# Patient Record
Sex: MALE
[De-identification: names, ages, dates, MRNs, and addresses within clinical notes are randomized per-mention and may not be internally consistent; named-entity substitution may affect disease eponyms.]

---

## 2017-03-22 ENCOUNTER — HOSPITAL ENCOUNTER (EMERGENCY)
Dept: HOSPITAL 14 - H.ER | Age: 82
Discharge: HOME | End: 2017-03-22
Payer: MEDICARE

## 2017-03-22 VITALS — OXYGEN SATURATION: 97 % | HEART RATE: 67 BPM

## 2017-03-22 VITALS — TEMPERATURE: 97.2 F | DIASTOLIC BLOOD PRESSURE: 80 MMHG | SYSTOLIC BLOOD PRESSURE: 147 MMHG | RESPIRATION RATE: 18 BRPM
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
## 2017-03-22 DIAGNOSIS — S29.9XXA: Primary | ICD-10-CM

## 2017-03-22 DIAGNOSIS — I10: ICD-10-CM
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
## 2017-03-22 NOTE — ED PDOC
HPI: Back


Time Seen by Provider: 17 14:53


Chief Complaint (Nursing): Back Pain


Chief Complaint (Provider): Left-Sided Back Pain


History Per: Patient


History/Exam Limitations: no limitations


Onset/Duration Of Symptoms: Days (x3)


Current Symptoms Are (Timing): Still Present


Quality Of Discomfort: Unable To Describe


Severity: Moderate


Previous Symptoms: None


Associated Symptoms: None


Additional Complaint(s): 


Rachid Stewart is an 81 year old male, with no pertinent pasta medical 

history, who presents to the ED on 17 for the evaluation of moderate, left

-sided mid-thoracic back pain that he has experienced x3 days s/p mechanical 

fall. Patient states that he had been cleaning a banister when he had lost his 

balance and fallen backwards down a set of 2 steps, striking the affected area 

against a radiator. Denies head trauma or loss of consciousness as well as 

shortness of breath. Has medicated with Tylenol and Advil without relief, last 

dose of the latter being as of 3 hours prior to arrival.





PMD: Fidel Martinez





Past Medical History


Reviewed: Historical Data, Nursing Documentation, Vital Signs


Vital Signs: 


 Last Vital Signs











Temp  97.2 F L  17 14:04


 


Pulse  95 H  17 14:04


 


Resp  18   17 14:04


 


BP  147/80   17 14:04


 


Pulse Ox  99   17 14:04














- Medical History


PMH: Asthma, HTN


   Denies: Chronic Kidney Disease





- Surgical History


Other surgeries: b/l TKR





- Family History


Family History: States: Unknown Family Hx





- Home Medications


Home Medications: 


 Ambulatory Orders











 Medication  Instructions  Recorded


 


Albuterol Sulfate [Proair Hfa] 2 puff IH Q4H PRN #0 inh 16


 


Calcium Carbonate/Vitamin D3 1 tab PO DAILY #0 tablet 16





[Calcium 600-Vit D3 200 Tablet]  


 


Esomeprazole Magnesium [Nexium] 40 mg PO DAILY #0 capsule. 16


 


Meclizine [Meclizine*] 25 mg PO TID #0 tab 16


 


Omega-3 Fatty Acids/Fish Oil [Fish 1 gm PO DAILY #0 capsule 16





Oil 1,000 mg Capsule]  


 


Ondansetron [Zofran Tab] 4 mg PO TID PRN #0 tab 16


 


Ropinirole HCl [Requip] 0.25 mg PO HS #0 tablet 16


 


Rosuvastatin Calcium [Crestor] 5 mg PO HS #0 tab 16


 


Theophylline [Pasquale-24 Cap] 300 mg PO Q12H #0 c12 16


 


oxyCODONE/Acetaminophen [Percocet 1 ea PO Q6 PRN #8 tab 17





5/325 mg Tab]  














- Allergies


Allergies/Adverse Reactions: 


 Allergies











Allergy/AdvReac Type Severity Reaction Status Date / Time


 


No Known Allergies Allergy   Verified 07/15/16 15:52














Review of Systems


Respiratory: Negative for: Shortness of Breath


Musculoskeletal: Positive for: Back Pain (left-sided, mid-thoracic back pain)





Physical Exam





- Reviewed


Nursing Documentation Reviewed: Yes


Vital Signs Reviewed: Yes





- Physical Exam


Appears: Positive for: Non-toxic, No Acute Distress


Back: Positive for: Other (left-lower posterior chest wall tenderness).  

Negative for: L CVA Tenderness, R CVA Tenderness, Vertebral Tenderness


Neurologic/Psych: Positive for: Alert, Oriented





- ECG


O2 Sat by Pulse Oximetry: 99 (RA)


Pulse Ox Interpretation: Normal





- Progress


ED Course And Treament: 


cxr: ? rib fx No pneumothorax





Seen by respiratory therapist for incentive spirometry.





Medical Decision Making


Medical Decision Makin:53


Initial Impression: rib pain/injury; will r/o fracture





Patient has declined offered analgesics.





Initial Plan:


* XR Left Ribs and PA Chest





--------------------------------------------------------------------------------

----------------- 


Scribe Attestation:


Documented by Sahnthi Katz, acting as a scribe for Shavon Moreno PA-C.





Provider Scribe Attestation:


All medical record entries made by the Scribe were at my direction and 

personally dictated by me. I have reviewed the chart and agree that the record 

accurately reflects my personal performance of the history, physical exam, 

medical decision making, and the department course for this patient. I have 

also personally directed, reviewed, and agree with the discharge instructions 

and disposition.





Disposition





- Clinical Impression


Clinical Impression: 


 Rib fracture





- Patient ED Disposition


Is Patient to be Admitted: No





- Disposition


Disposition: Routine/Home


Disposition Time: 15:25


Condition: FAIR


Additional Instructions: 


F/U WITH DR. MARTINEZ IN 2 DAYS.


Prescriptions: 


oxyCODONE/Acetaminophen [Percocet 5/325 mg Tab] 1 ea PO Q6 PRN #8 tab


 PRN Reason: Pain, Severe (8-10)


Instructions:  Rib Fracture (ED)

## 2017-03-22 NOTE — RAD
PROCEDURE:  Radiographs of the Chest and Left Ribs.



HISTORY:

rib injury



COMPARISON:

7/15/2016. 



TECHNIQUE:

Frontal radiograph of the chest and multiple oblique radiographs of 

the left ribs were obtained.



FINDINGS:



LEFT RIBS:

No fracture or focal lesion visualized.



LUNGS:

Clear.



PLEURA:

Minimal blunting of left costophrenic angle may reflect pleural 

effusion or chronic pleural thickening. No pneumothorax.



CARDIOVASCULAR:

Normal sized heart. No pulmonary vascular congestion.



OTHER FINDINGS:

None.



IMPRESSION:

No evidence of left rib fracture.  Minimal blunting of left 

costophrenic angle.  Possible chronic pleural thickening versus small 

pleural effusion.

## 2017-12-15 ENCOUNTER — HOSPITAL ENCOUNTER (EMERGENCY)
Dept: HOSPITAL 14 - H.ER | Age: 82
Discharge: HOME | End: 2017-12-15
Payer: MEDICARE

## 2017-12-15 VITALS
DIASTOLIC BLOOD PRESSURE: 77 MMHG | RESPIRATION RATE: 17 BRPM | SYSTOLIC BLOOD PRESSURE: 134 MMHG | TEMPERATURE: 97 F | HEART RATE: 63 BPM | OXYGEN SATURATION: 99 %

## 2017-12-15 VITALS — BODY MASS INDEX: 27.1 KG/M2

## 2017-12-15 DIAGNOSIS — I10: ICD-10-CM

## 2017-12-15 DIAGNOSIS — J45.909: ICD-10-CM

## 2017-12-15 DIAGNOSIS — M25.531: Primary | ICD-10-CM

## 2017-12-15 NOTE — ED PDOC
Upper Extremity Pain/Injury


Time Seen by Provider: 12/15/17 08:23


Chief Complaint (Nursing): Upper Extremity Problem/Injury


Chief Complaint (Provider): Right Wrist Pain


History Per: Patient


History/Exam Limitations: no limitations


Onset/Duration Of Symptoms: Days (x2)


Current Symptoms Are (Timing): Still Present


Additional Complaint(s): 





Rachid Stewart is an 82 year old male with a past medical history of Asthma 

and Diabetes presenting to the ED for an evaluation of right wrist pain 

occurring for 2 days prior to arrival. The patient states associated tingling 

to his 3rd, 4th, and 5th fingers on his right hand and limited ability to move 

his hand due to the pain. He reports taking Tylenol for the pain, without 

relief. He denies experiencing any injury, excessive typing, lifting anything 

heavy, numbness, weakness, leg pain, chest pain, shortness of breath, cough, 

headache, or dizziness. 





PMD: Fidel Martinez MD








Past Medical History


Reviewed: Historical Data, Nursing Documentation, Vital Signs


Vital Signs: 


 Last Vital Signs











Temp  97 F L  12/15/17 08:13


 


Pulse  63   12/15/17 08:13


 


Resp  17   12/15/17 08:13


 


BP  134/77   12/15/17 08:13


 


Pulse Ox  99   12/15/17 08:13














- Medical History


PMH: Asthma, HTN


   Denies: Chronic Kidney Disease





- Surgical History


Surgical History: No Surg Hx





- Family History


Family History: States: No Known Family Hx





- Home Medications


Home Medications: 


 Ambulatory Orders











 Medication  Instructions  Recorded


 


RX: Albuterol Sulfate [Proair Hfa] 2 puff IH Q4H PRN #0 inh 07/16/16


 


RX: Calcium Carbonate/Vitamin D3 1 tab PO DAILY #0 tablet 07/16/16





[Calcium 600-Vit D3 200 Tablet]  


 


RX: Esomeprazole Magnesium [Nexium] 40 mg PO DAILY #0 capsule. 07/16/16


 


RX: Meclizine [Meclizine*] 25 mg PO TID #0 tab 07/16/16


 


RX: Omega-3 Fatty Acids/Fish Oil 1 gm PO DAILY #0 capsule 07/16/16





[Fish Oil 1,000 mg Capsule]  


 


RX: Ondansetron [Zofran Tab] 4 mg PO TID PRN #0 tab 07/16/16


 


RX: Ropinirole HCl [Requip] 0.25 mg PO HS #0 tablet 07/16/16


 


RX: Rosuvastatin Calcium [Crestor] 5 mg PO HS #0 tab 07/16/16


 


RX: Theophylline [Pasquale-24 Tab] 300 mg PO Q12H #0 c12 07/16/16


 


oxyCODONE/Acetaminophen [Percocet 1 ea PO Q6 PRN #8 tab 03/22/17





5/325 mg Tab]  














- Allergies


Allergies/Adverse Reactions: 


 Allergies











Allergy/AdvReac Type Severity Reaction Status Date / Time


 


No Known Allergies Allergy   Verified 07/15/16 15:52














Review of Systems


ROS Statement: Except As Marked, All Systems Reviewed And Found Negative


Constitutional: Negative for: Other (no recent injury)


Cardiovascular: Negative for: Chest Pain


Respiratory: Negative for: Cough, Shortness of Breath


Musculoskeletal: Positive for: Hand Pain (right wrist pain; limited ROM 

secondary to pain).  Negative for: Leg Pain


Neurological: Positive for: Other (tingling to 3rd, 4th, and 5th fingers).  

Negative for: Weakness, Numbness, Headache, Dizziness





Physical Exam





- Reviewed


Nursing Documentation Reviewed: Yes


Vital Signs Reviewed: Yes





- Physical Exam


Appears: Positive for: Non-toxic, No Acute Distress


Head Exam: Positive for: ATRAUMATIC, NORMOCEPHALIC


Skin: Positive for: Normal Color, Warm, Dry


Eye Exam: Positive for: Normal appearance, EOMI


ENT: Positive for: Normal ENT Inspection


Neck: Positive for: Normal, Painless ROM, Supple


Cardiovascular/Chest: Positive for: Regular Rate, Rhythm, Chest Non Tender.  

Negative for: Murmur


Respiratory: Positive for: Normal Breath Sounds.  Negative for: Respiratory 

Distress


Pulses-Radial (L): 2+ (and 2+ ulnar aspect)


Pulses-Radial (R): 2+ (and 2+ ulnar aspect)


Gastrointestinal/Abdominal: Positive for: Normal Exam, Soft.  Negative for: 

Tenderness


Back: Positive for: Normal Inspection


Extremity: Positive for: Tenderness (to right medial wrist).  Negative for: 

Normal ROM (limited ROM to medial/lateral area of right wrist and fingers 

secondary to pain), Pedal Edema, Deformity, Swelling


Neurologic/Psych: Positive for: Alert, Oriented (x3).  Negative for: Motor/

Sensory Deficits





- ECG


O2 Sat by Pulse Oximetry: 99 (RA)


Pulse Ox Interpretation: Normal





- Radiology


X-Ray: Interpreted by Me, Viewed By Me


X-Ray Interpretation: No Acute Disease





- Progress


ED Course And Treament: 





933:  Stable.  AAOx3.  Pain controlled.  FU with pcp.





Medical Decision Making


Medical Decision Making: 





Time: 08:23


Impression: Right Wrist Pain


Plan:


* Motrin Tab 600 mg PO


* [RAD] Wrist, Right 3 Views


* Reevaluation





--------------------------------------------------------------------------------

----------------- 


Scribe Attestation:


Documented by Isabela Ballard, acting as a scribe for Rudy Morrison MD.


 


Provider Scribe Attestation:


All medical record entries made by the Scribe were at my direction and 

personally dictated by me. I have reviewed the chart and agree that the record 

accurately reflects my personal performance of the history, physical exam, 

medical decision making, and the department course for this patient. I have 

also personally directed, reviewed, and agree with the discharge instructions 

and disposition.








Disposition





- Clinical Impression


Clinical Impression: 


 Wrist pain








- Patient ED Disposition


Is Patient to be Admitted: No


Counseled Patient/Family Regarding: Studies Performed, Diagnosis, Need For 

Followup





- Disposition


Referrals: 


[facility] [Outside] - 12/18/17


Disposition: Routine/Home


Disposition Time: 09:35


Condition: STABLE


Additional Instructions: 


Return if not better in 3 days. 


Instructions:  Arthralgia (ED)


Forms:  [facility] (English)

## 2017-12-15 NOTE — RAD
PROCEDURE:  Right Wrist Radiographs.







HISTORY:

pain



COMPARISON:

None.



FINDINGS:



BONES:

No acute fracture.  Diffuse osteopenia.







JOINTS:

Osteoarthritic changes, incompletely visualize.  There is evidence of 

chondrocalcinosis common normal variant



SOFT TISSUES:

Normal. 



OTHER FINDINGS:

None.



IMPRESSION:

No acute findings related to/accounting  for the clinical 

presentation. Additional benign and/or incidental findings described 

above.



___________________________________________________________



Concordant results with the preliminary interpretation rendered by 

the emergency department physician

procedure.

## 2019-03-31 ENCOUNTER — HOSPITAL ENCOUNTER (EMERGENCY)
Dept: HOSPITAL 14 - H.ER | Age: 84
Discharge: HOME | End: 2019-03-31
Payer: MEDICARE

## 2019-03-31 VITALS — TEMPERATURE: 97.9 F | DIASTOLIC BLOOD PRESSURE: 64 MMHG | SYSTOLIC BLOOD PRESSURE: 123 MMHG

## 2019-03-31 VITALS — RESPIRATION RATE: 18 BRPM | OXYGEN SATURATION: 99 %

## 2019-03-31 VITALS — BODY MASS INDEX: 27.9 KG/M2

## 2019-03-31 VITALS — HEART RATE: 91 BPM

## 2019-03-31 DIAGNOSIS — I10: ICD-10-CM

## 2019-03-31 DIAGNOSIS — Z79.899: ICD-10-CM

## 2019-03-31 DIAGNOSIS — J45.901: Primary | ICD-10-CM

## 2019-03-31 LAB
BASOPHILS # BLD AUTO: 0 K/UL (ref 0–0.2)
BASOPHILS NFR BLD: 0.4 % (ref 0–2)
BNP SERPL-MCNC: 200 PG/ML (ref 0–900)
BUN SERPL-MCNC: 12 MG/DL (ref 9–20)
CALCIUM SERPL-MCNC: 9.5 MG/DL (ref 8.4–10.2)
EOSINOPHIL # BLD AUTO: 0.2 K/UL (ref 0–0.7)
EOSINOPHIL NFR BLD: 2 % (ref 0–4)
ERYTHROCYTE [DISTWIDTH] IN BLOOD BY AUTOMATED COUNT: 16.4 % (ref 11.5–14.5)
GFR NON-AFRICAN AMERICAN: > 60
HGB BLD-MCNC: 10 G/DL (ref 12–18)
LYMPHOCYTES # BLD AUTO: 1.6 K/UL (ref 1–4.3)
LYMPHOCYTES NFR BLD AUTO: 20.1 % (ref 20–40)
MCH RBC QN AUTO: 24.9 PG (ref 27–31)
MCHC RBC AUTO-ENTMCNC: 31.9 G/DL (ref 33–37)
MCV RBC AUTO: 78 FL (ref 80–94)
MONOCYTES # BLD: 0.9 K/UL (ref 0–0.8)
MONOCYTES NFR BLD: 10.7 % (ref 0–10)
NEUTROPHILS # BLD: 5.4 K/UL (ref 1.8–7)
NEUTROPHILS NFR BLD AUTO: 66.8 % (ref 50–75)
NRBC BLD AUTO-RTO: 0 % (ref 0–0)
PLATELET # BLD: 235 K/UL (ref 130–400)
PMV BLD AUTO: 8.4 FL (ref 7.2–11.7)
RBC # BLD AUTO: 4.02 MIL/UL (ref 4.4–5.9)
WBC # BLD AUTO: 8.1 K/UL (ref 4.8–10.8)

## 2019-03-31 PROCEDURE — 84484 ASSAY OF TROPONIN QUANT: CPT

## 2019-03-31 PROCEDURE — 99283 EMERGENCY DEPT VISIT LOW MDM: CPT

## 2019-03-31 PROCEDURE — 71046 X-RAY EXAM CHEST 2 VIEWS: CPT

## 2019-03-31 PROCEDURE — 83880 ASSAY OF NATRIURETIC PEPTIDE: CPT

## 2019-03-31 PROCEDURE — 80048 BASIC METABOLIC PNL TOTAL CA: CPT

## 2019-03-31 PROCEDURE — 87804 INFLUENZA ASSAY W/OPTIC: CPT

## 2019-03-31 PROCEDURE — 80198 ASSAY OF THEOPHYLLINE: CPT

## 2019-03-31 PROCEDURE — 85025 COMPLETE CBC W/AUTO DIFF WBC: CPT

## 2019-03-31 PROCEDURE — 93005 ELECTROCARDIOGRAM TRACING: CPT

## 2019-03-31 PROCEDURE — 96374 THER/PROPH/DIAG INJ IV PUSH: CPT

## 2019-03-31 NOTE — ED PDOC
HPI: Asthma


Time Seen by Provider: 03/31/19 10:45


Chief Complaint (Nursing): Cough, Cold, Congestion


Chief Complaint (Provider): shortness of breath, cough


History Per: Patient, Family


History/Exam Limitations: no limitations


Onset/Duration Of Symptoms: Days (5), Gradual


Associated Symptoms: Dyspnea, Cough, URI.  denies: Sputum Production, Fever


Precipitating Factors: URI Symptoms


Severity: Moderate


Additional Complaint(s): 





82yo male presents w family notes ongoing and worsening cough with dyspnea for 

about 5 days, had CXR thursday, does not know results yet, denies edema or 

syncope. Does not SOB worse with laying down. Denies fever but has mild 

weakness, sore throat and headache. Did receive flu shot this year. 





Past Medical History


Reviewed: Historical Data, Nursing Documentation, Vital Signs


Vital Signs: 





                                Last Vital Signs











Temp  98.0 F   03/31/19 10:40


 


Pulse  91 H  03/31/19 10:40


 


Resp  18   03/31/19 10:40


 


BP  150/79   03/31/19 10:40


 


Pulse Ox  99   03/31/19 10:40














- Medical History


PMH: Asthma, HTN


   Denies: Chronic Kidney Disease





- Surgical History


Other surgeries: knees





- Family History


Family History: States: Unknown Family Hx





- Social History


Current smoker - smoking cessation education provided: No





- Home Medications


Home Medications: 


                                Ambulatory Orders











 Medication  Instructions  Recorded


 


Albuterol Sulfate [Proair Hfa] 2 puff IH Q4H PRN #0 inh 07/16/16


 


Calcium Carbonate/Vitamin D3 1 tab PO DAILY #0 tablet 07/16/16





[Calcium 600-Vit D3 200 Tablet]  


 


Esomeprazole Magnesium [Nexium] 40 mg PO DAILY #0 capsule. 07/16/16


 


Meclizine [Meclizine*] 25 mg PO TID #0 tab 07/16/16


 


Omega-3 Fatty Acids/Fish Oil [Fish 1 gm PO DAILY #0 capsule 07/16/16





Oil 1,000 mg Capsule]  


 


Ondansetron [Zofran Tab] 4 mg PO TID PRN #0 tab 07/16/16


 


Ropinirole HCl [Requip] 0.25 mg PO HS #0 tablet 07/16/16


 


Rosuvastatin Calcium [Crestor] 5 mg PO HS #0 tab 07/16/16


 


Theophylline [Pasquale-24 Tab] 300 mg PO Q12H #0 c12 07/16/16


 


oxyCODONE/Acetaminophen [Percocet 1 ea PO Q6 PRN #8 tab 03/22/17





5/325 mg Tab]  


 


Albuterol 0.083% [Albuterol 0.083% 2.5 mg IH Q4 PRN #20 neb 03/31/19





Inhal Sol (2.5 mg/3 ml) UD]  


 


Prednisone 50 mg PO DAILY #4 tab 03/31/19














- Allergies


Allergies/Adverse Reactions: 


                                    Allergies











Allergy/AdvReac Type Severity Reaction Status Date / Time


 


No Known Allergies Allergy   Verified 07/15/16 15:52














Review of Systems


Constitutional: Positive for: Chills, Malaise.  Negative for: Fever


Cardiovascular: Positive for: Orthopnea.  Negative for: Edema, Light Headedness


Respiratory: Positive for: Cough, Shortness of Breath, SOB with Exertion, 

Wheezing


Gastrointestinal: Negative for: Abdominal Pain


Musculoskeletal: Negative for: Neck Pain, Back Pain


Skin: Negative for: Rash, Lesions


Neurological: Positive for: Headache.  Negative for: Weakness, Numbness, Altered

Mental Status


Psych: Negative for: Suicidal ideation





Physical Exam





- Reviewed


Nursing Documentation Reviewed: Yes


Vital Signs Reviewed: Yes





- Physical Exam


Appears: Positive for: Well, Non-toxic, No Acute Distress


Head Exam: Positive for: ATRAUMATIC, NORMAL INSPECTION, NORMOCEPHALIC


Skin: Positive for: Normal Color, Warm, DRY


Eye Exam: Positive for: EOMI, Normal appearance, PERRL


ENT: Positive for: Normal ENT Inspection


Neck: Positive for: Normal, Painless ROM


Cardiovascular/Chest: Positive for: Regular Rate, Rhythm


Respiratory: Positive for: Decreased Breath Sounds, Wheezing.  Negative for: 

Respiratory Distress


Gastrointestinal/Abdominal: Positive for: Soft


Back: Positive for: Normal Inspection


Extremity: Positive for: Normal ROM.  Negative for: Deformity, Swelling


Neurological/Psych: Positive for: Awake, Alert, Normal Tone





- Laboratory Results


Result Diagrams: 


                                 03/31/19 11:00





                                 03/31/19 11:00





- ECG


ECG: Positive for: Interpreted By Me


ECG Rhythm: Positive for: Sinus Rhythm, Nonspecific Changes


Rate: 91


O2 Sat by Pulse Oximetry: 99


Pulse Ox Interpretation: Normal





Medical Decision Making


Medical Decision Making: 





labs reviewed, normal WBC, unremarkable chem, normal BNP and trop





CXR repeated and no change since prior exam





duoneb x3 given and clinically much improved, wheeze resolved, patient ambulated

well, tolerated full PO and wished to go home.


D/w Dr Martinez PMD, will add prednisone pulse dose, he requested theophyline level

and followup early this week in office


No tachycardia, no hypoxia, normal mentation and resp effort on discharge











Disposition





- Clinical Impression


Clinical Impression: 


 Asthma exacerbation








- Patient ED Disposition


Is Patient to be Admitted: No





- Disposition


Referrals: 


Fidel Martinez MD [Family Provider] - 


Disposition: Routine/Home


Disposition Time: 14:10


Condition: STABLE


Additional Instructions: 


Followup with Dr Martinez tomorrow, return to ER for any worse or new symptoms. 

Take medications as directed.





Prescriptions: 


Albuterol 0.083% [Albuterol 0.083% Inhal Sol (2.5 mg/3 ml) UD] 2.5 mg IH Q4 PRN 

#20 neb


 PRN Reason: Wheezing


Prednisone 50 mg PO DAILY #4 tab


Instructions:  Asthma, Adult (DC), Medicines for Asthma

## 2019-03-31 NOTE — RAD
Date of service: 



03/31/2019



HISTORY:

 persistent and worsening cough 



COMPARISON:

Comparison chest 03/28/2019.



TECHNIQUE:

Chest PA and lateral views



FINDINGS:



LUNGS:

Minor bibasilar atelectasis left greater than right.  There is 

flattening of the hemidiaphragms and slight increase in in 

retrosternal airspace consistent with underlying COPD 



PLEURA:

No significant pleural effusion identified. No pneumothorax apparent.



CARDIOVASCULAR:

Mild aortic atherosclerotic calcification present.



Heart size is upper limits of normal/borderline enlarged no pulmonary 

vascular congestion. 



OSSEOUS STRUCTURES:

Minor multilevel degenerative spondylosis of the thoracic spine.



VISUALIZED UPPER ABDOMEN:

Normal.



OTHER FINDINGS:

None.



IMPRESSION:

Minor bibasilar atelectasis left greater than right.  There is 

flattening of the hemidiaphragms and slight increase in in 

retrosternal airspace consistent with underlying COPD

## 2019-04-01 NOTE — CARD
--------------- APPROVED REPORT --------------





Date of service: 03/31/2019



EKG Measurement

Heart [id number]

[id number]

[id number]

[id number]

[id number]



<Conclusion>

Normal sinus rhythm

Nonspecific ST and T wave abnormality

Abnormal ECG

## 2022-03-24 ENCOUNTER — NEW PATIENT (OUTPATIENT)
Dept: URBAN - METROPOLITAN AREA SURGICAL CENTER 72 | Facility: SURGICAL CENTER | Age: 87
End: 2022-03-24

## 2022-03-24 DIAGNOSIS — H04.123: ICD-10-CM

## 2022-03-24 DIAGNOSIS — H18.413: ICD-10-CM

## 2022-03-24 DIAGNOSIS — H52.4: ICD-10-CM

## 2022-03-24 DIAGNOSIS — H52.03: ICD-10-CM

## 2022-03-24 DIAGNOSIS — H40.1133: ICD-10-CM

## 2022-03-24 DIAGNOSIS — H43.393: ICD-10-CM

## 2022-03-24 DIAGNOSIS — H52.203: ICD-10-CM

## 2022-03-24 PROCEDURE — 99204 OFFICE O/P NEW MOD 45 MIN: CPT

## 2022-03-24 PROCEDURE — 92020 GONIOSCOPY: CPT

## 2022-03-24 PROCEDURE — 92133 CPTRZD OPH DX IMG PST SGM ON: CPT

## 2022-03-24 PROCEDURE — 92015 DETERMINE REFRACTIVE STATE: CPT

## 2022-03-24 ASSESSMENT — VISUAL ACUITY
OD_CC: 20/50
OS_CC: 20/200
OS_SC: 20/200
OD_SC: 20/50

## 2022-03-24 ASSESSMENT — KERATOMETRY
OS_K1POWER_DIOPTERS: 40.75
OD_AXISANGLE_DEGREES: 27
OD_AXISANGLE2_DEGREES: 117
OS_AXISANGLE_DEGREES: 151
OS_K2POWER_DIOPTERS: 43.25
OD_K1POWER_DIOPTERS: 40.75
OD_K2POWER_DIOPTERS: 41.75
OS_AXISANGLE2_DEGREES: 61

## 2022-03-24 ASSESSMENT — TONOMETRY
OS_IOP_MMHG: 16
OD_IOP_MMHG: 16

## 2022-07-26 ENCOUNTER — FOLLOW UP (OUTPATIENT)
Dept: URBAN - METROPOLITAN AREA SURGICAL CENTER 72 | Facility: SURGICAL CENTER | Age: 87
End: 2022-07-26

## 2022-07-26 DIAGNOSIS — H04.123: ICD-10-CM

## 2022-07-26 DIAGNOSIS — H53.022: ICD-10-CM

## 2022-07-26 DIAGNOSIS — H40.1123: ICD-10-CM

## 2022-07-26 PROCEDURE — 99214 OFFICE O/P EST MOD 30 MIN: CPT

## 2022-07-26 PROCEDURE — 92020 GONIOSCOPY: CPT

## 2022-07-26 PROCEDURE — 76514 ECHO EXAM OF EYE THICKNESS: CPT

## 2022-07-26 ASSESSMENT — KERATOMETRY
OD_K2POWER_DIOPTERS: 41.75
OS_K2POWER_DIOPTERS: 43.25
OD_K1POWER_DIOPTERS: 40.75
OS_K1POWER_DIOPTERS: 40.75
OD_AXISANGLE2_DEGREES: 117
OD_AXISANGLE_DEGREES: 27
OS_AXISANGLE2_DEGREES: 61
OS_AXISANGLE_DEGREES: 151

## 2022-07-26 ASSESSMENT — VISUAL ACUITY
OS_CC: 20/200
OD_CC: 20/40

## 2022-07-26 ASSESSMENT — TONOMETRY
OD_IOP_MMHG: 21
OS_IOP_MMHG: 24

## 2022-07-26 ASSESSMENT — PACHYMETRY
OS_CT_UM: 563
OD_CT_UM: 552

## 2022-08-23 ENCOUNTER — PROCEDURE ONLY (OUTPATIENT)
Dept: URBAN - METROPOLITAN AREA SURGICAL CENTER 72 | Facility: SURGICAL CENTER | Age: 87
End: 2022-08-23

## 2022-08-23 DIAGNOSIS — H40.1123: ICD-10-CM

## 2022-08-23 PROCEDURE — 65855 TRABECULOPLASTY LASER SURG: CPT

## 2022-08-23 ASSESSMENT — VISUAL ACUITY
OD_CC: 20/50-1
OS_CC: 20/200

## 2022-08-23 ASSESSMENT — KERATOMETRY
OS_AXISANGLE2_DEGREES: 61
OS_K1POWER_DIOPTERS: 40.75
OD_K2POWER_DIOPTERS: 41.75
OS_K2POWER_DIOPTERS: 43.25
OD_K1POWER_DIOPTERS: 40.75
OS_AXISANGLE_DEGREES: 151
OD_AXISANGLE_DEGREES: 27
OD_AXISANGLE2_DEGREES: 117

## 2022-08-23 ASSESSMENT — TONOMETRY
OS_IOP_MMHG: 19
OD_IOP_MMHG: 18

## 2022-09-13 ENCOUNTER — IOP CHECK (OUTPATIENT)
Dept: URBAN - METROPOLITAN AREA SURGICAL CENTER 72 | Facility: SURGICAL CENTER | Age: 87
End: 2022-09-13

## 2022-09-13 DIAGNOSIS — H40.1123: ICD-10-CM

## 2022-09-13 PROCEDURE — 99213 OFFICE O/P EST LOW 20 MIN: CPT

## 2022-09-13 ASSESSMENT — KERATOMETRY
OD_AXISANGLE_DEGREES: 27
OS_AXISANGLE_DEGREES: 151
OS_K1POWER_DIOPTERS: 40.75
OS_AXISANGLE2_DEGREES: 61
OD_K1POWER_DIOPTERS: 40.75
OD_AXISANGLE2_DEGREES: 117
OD_K2POWER_DIOPTERS: 41.75
OS_K2POWER_DIOPTERS: 43.25

## 2022-09-13 ASSESSMENT — TONOMETRY
OD_IOP_MMHG: 16
OS_IOP_MMHG: 15
OD_IOP_MMHG: 18
OS_IOP_MMHG: 14

## 2022-09-13 ASSESSMENT — VISUAL ACUITY
OD_CC: 20/40-2
OS_CC: 20/150-2

## 2022-11-07 ENCOUNTER — PROCEDURE ONLY (OUTPATIENT)
Dept: URBAN - METROPOLITAN AREA SURGICAL CENTER 72 | Facility: SURGICAL CENTER | Age: 87
End: 2022-11-07

## 2022-11-07 DIAGNOSIS — H40.1133: ICD-10-CM

## 2022-11-07 PROCEDURE — 65855 TRABECULOPLASTY LASER SURG: CPT

## 2022-11-07 ASSESSMENT — VISUAL ACUITY
OD_CC: 20/30-2
OS_CC: 20/400

## 2022-11-07 ASSESSMENT — KERATOMETRY
OS_K1POWER_DIOPTERS: 40.75
OS_K2POWER_DIOPTERS: 43.25
OS_AXISANGLE2_DEGREES: 61
OD_K1POWER_DIOPTERS: 40.75
OD_AXISANGLE_DEGREES: 27
OD_K2POWER_DIOPTERS: 41.75
OS_AXISANGLE_DEGREES: 151
OD_AXISANGLE2_DEGREES: 117

## 2022-11-07 ASSESSMENT — TONOMETRY
OD_IOP_MMHG: 17
OD_IOP_MMHG: 10
OS_IOP_MMHG: 11

## 2022-11-21 ENCOUNTER — IOP CHECK (OUTPATIENT)
Dept: URBAN - METROPOLITAN AREA SURGICAL CENTER 72 | Facility: SURGICAL CENTER | Age: 87
End: 2022-11-21

## 2022-11-21 DIAGNOSIS — H40.1133: ICD-10-CM

## 2022-11-21 PROCEDURE — 99213 OFFICE O/P EST LOW 20 MIN: CPT

## 2022-11-21 ASSESSMENT — TONOMETRY
OS_IOP_MMHG: 10
OS_IOP_MMHG: 10
OD_IOP_MMHG: 11
OD_IOP_MMHG: 5

## 2022-11-21 ASSESSMENT — KERATOMETRY
OD_K1POWER_DIOPTERS: 40.75
OD_AXISANGLE_DEGREES: 27
OD_K2POWER_DIOPTERS: 41.75
OS_AXISANGLE2_DEGREES: 61
OD_AXISANGLE2_DEGREES: 117
OS_AXISANGLE_DEGREES: 151
OS_K1POWER_DIOPTERS: 40.75
OS_K2POWER_DIOPTERS: 43.25

## 2022-11-21 ASSESSMENT — VISUAL ACUITY
OS_CC: 20/400
OD_CC: 20/30-2

## 2023-03-14 ENCOUNTER — IOP CHECK (OUTPATIENT)
Dept: URBAN - METROPOLITAN AREA SURGICAL CENTER 72 | Facility: SURGICAL CENTER | Age: 88
End: 2023-03-14

## 2023-03-14 DIAGNOSIS — H40.1133: ICD-10-CM

## 2023-03-14 PROCEDURE — 99213 OFFICE O/P EST LOW 20 MIN: CPT

## 2023-03-14 ASSESSMENT — KERATOMETRY
OD_AXISANGLE_DEGREES: 27
OD_K2POWER_DIOPTERS: 41.75
OS_K1POWER_DIOPTERS: 40.75
OS_AXISANGLE2_DEGREES: 61
OD_K1POWER_DIOPTERS: 40.75
OS_K2POWER_DIOPTERS: 43.25
OD_AXISANGLE2_DEGREES: 117
OS_AXISANGLE_DEGREES: 151

## 2023-03-14 ASSESSMENT — VISUAL ACUITY
OS_CC: 20/200
OD_CC: 20/60

## 2023-03-14 ASSESSMENT — TONOMETRY: OD_IOP_MMHG: 14

## 2023-06-29 ENCOUNTER — ESTABLISHED COMPREHENSIVE EXAM (OUTPATIENT)
Dept: URBAN - METROPOLITAN AREA SURGICAL CENTER 72 | Facility: SURGICAL CENTER | Age: 88
End: 2023-06-29

## 2023-06-29 DIAGNOSIS — H04.123: ICD-10-CM

## 2023-06-29 DIAGNOSIS — H40.1133: ICD-10-CM

## 2023-06-29 DIAGNOSIS — H02.053: ICD-10-CM

## 2023-06-29 DIAGNOSIS — H52.4: ICD-10-CM

## 2023-06-29 DIAGNOSIS — H02.056: ICD-10-CM

## 2023-06-29 PROCEDURE — 92133 CPTRZD OPH DX IMG PST SGM ON: CPT

## 2023-06-29 PROCEDURE — 67820 REVISE EYELASHES: CPT

## 2023-06-29 PROCEDURE — 92015 DETERMINE REFRACTIVE STATE: CPT

## 2023-06-29 PROCEDURE — 99214 OFFICE O/P EST MOD 30 MIN: CPT

## 2023-06-29 ASSESSMENT — KERATOMETRY
OD_AXISANGLE2_DEGREES: 117
OD_K1POWER_DIOPTERS: 40.75
OD_AXISANGLE_DEGREES: 27
OS_AXISANGLE2_DEGREES: 61
OS_K2POWER_DIOPTERS: 43.25
OS_AXISANGLE_DEGREES: 151
OD_K2POWER_DIOPTERS: 41.75
OS_K1POWER_DIOPTERS: 40.75

## 2023-06-29 ASSESSMENT — VISUAL ACUITY
OD_CC: 20/60-2
OS_CC: 20/200-

## 2023-06-29 ASSESSMENT — TONOMETRY
OD_IOP_MMHG: 18
OS_IOP_MMHG: 18
OS_IOP_MMHG: 11
OD_IOP_MMHG: 15

## 2023-10-05 ENCOUNTER — IOP CHECK (OUTPATIENT)
Dept: URBAN - METROPOLITAN AREA SURGICAL CENTER 72 | Facility: SURGICAL CENTER | Age: 88
End: 2023-10-05

## 2023-10-05 DIAGNOSIS — H02.053: ICD-10-CM

## 2023-10-05 DIAGNOSIS — H02.056: ICD-10-CM

## 2023-10-05 DIAGNOSIS — H40.1133: ICD-10-CM

## 2023-10-05 DIAGNOSIS — H04.123: ICD-10-CM

## 2023-10-05 PROCEDURE — 67820 REVISE EYELASHES: CPT | Mod: 25

## 2023-10-05 PROCEDURE — 99214 OFFICE O/P EST MOD 30 MIN: CPT

## 2023-10-05 ASSESSMENT — VISUAL ACUITY
OD_CC: 20/50
OS_CC: 20/200-2

## 2023-10-05 ASSESSMENT — KERATOMETRY
OD_K1POWER_DIOPTERS: 40.75
OS_K1POWER_DIOPTERS: 40.75
OD_AXISANGLE_DEGREES: 27
OS_AXISANGLE2_DEGREES: 61
OD_K2POWER_DIOPTERS: 41.75
OS_AXISANGLE_DEGREES: 151
OS_K2POWER_DIOPTERS: 43.25
OD_AXISANGLE2_DEGREES: 117

## 2023-10-05 ASSESSMENT — TONOMETRY
OS_IOP_MMHG: 14
OS_IOP_MMHG: 10
OD_IOP_MMHG: 12
OD_IOP_MMHG: 16

## 2023-11-17 ENCOUNTER — IOP CHECK (OUTPATIENT)
Dept: URBAN - METROPOLITAN AREA SURGICAL CENTER 72 | Facility: SURGICAL CENTER | Age: 88
End: 2023-11-17

## 2023-11-17 DIAGNOSIS — H04.123: ICD-10-CM

## 2023-11-17 DIAGNOSIS — H02.053: ICD-10-CM

## 2023-11-17 DIAGNOSIS — H40.1123: ICD-10-CM

## 2023-11-17 DIAGNOSIS — H40.1112: ICD-10-CM

## 2023-11-17 DIAGNOSIS — H02.056: ICD-10-CM

## 2023-11-17 PROCEDURE — 99213 OFFICE O/P EST LOW 20 MIN: CPT

## 2023-11-17 ASSESSMENT — KERATOMETRY
OS_K1POWER_DIOPTERS: 40.75
OS_AXISANGLE2_DEGREES: 61
OD_AXISANGLE2_DEGREES: 117
OS_AXISANGLE_DEGREES: 151
OS_K2POWER_DIOPTERS: 43.25
OD_K2POWER_DIOPTERS: 41.75
OD_K1POWER_DIOPTERS: 40.75
OD_AXISANGLE_DEGREES: 27

## 2023-11-17 ASSESSMENT — TONOMETRY
OD_IOP_MMHG: 12
OS_IOP_MMHG: 14
OS_IOP_MMHG: 11
OD_IOP_MMHG: 13

## 2023-11-17 ASSESSMENT — VISUAL ACUITY
OD_CC: 20/50+1
OS_CC: 20/150

## 2023-12-20 ENCOUNTER — CONSULTATION (OUTPATIENT)
Dept: URBAN - METROPOLITAN AREA CLINIC 92 | Facility: CLINIC | Age: 88
End: 2023-12-20

## 2023-12-20 DIAGNOSIS — H02.056: ICD-10-CM

## 2023-12-20 DIAGNOSIS — H04.123: ICD-10-CM

## 2023-12-20 DIAGNOSIS — H02.883: ICD-10-CM

## 2023-12-20 DIAGNOSIS — H02.839: ICD-10-CM

## 2023-12-20 DIAGNOSIS — H02.053: ICD-10-CM

## 2023-12-20 DIAGNOSIS — H02.886: ICD-10-CM

## 2023-12-20 PROCEDURE — 99214 OFFICE O/P EST MOD 30 MIN: CPT

## 2023-12-20 PROCEDURE — 92285 EXTERNAL OCULAR PHOTOGRAPHY: CPT

## 2023-12-20 ASSESSMENT — KERATOMETRY
OD_K2POWER_DIOPTERS: 41.75
OD_AXISANGLE2_DEGREES: 117
OS_AXISANGLE_DEGREES: 151
OD_AXISANGLE_DEGREES: 27
OS_K2POWER_DIOPTERS: 43.25
OD_K1POWER_DIOPTERS: 40.75
OS_K1POWER_DIOPTERS: 40.75
OS_AXISANGLE2_DEGREES: 61

## 2023-12-20 ASSESSMENT — VISUAL ACUITY
OD_CC: 20/50
OS_CC: 20/200+

## 2024-03-08 ENCOUNTER — IOP CHECK (OUTPATIENT)
Dept: URBAN - METROPOLITAN AREA SURGICAL CENTER 72 | Facility: SURGICAL CENTER | Age: 89
End: 2024-03-08

## 2024-03-08 DIAGNOSIS — H02.839: ICD-10-CM

## 2024-03-08 DIAGNOSIS — H40.1112: ICD-10-CM

## 2024-03-08 DIAGNOSIS — H02.053: ICD-10-CM

## 2024-03-08 DIAGNOSIS — H02.883: ICD-10-CM

## 2024-03-08 DIAGNOSIS — H02.056: ICD-10-CM

## 2024-03-08 DIAGNOSIS — H40.1123: ICD-10-CM

## 2024-03-08 DIAGNOSIS — H02.886: ICD-10-CM

## 2024-03-08 DIAGNOSIS — H04.123: ICD-10-CM

## 2024-03-08 PROCEDURE — 92133 CPTRZD OPH DX IMG PST SGM ON: CPT

## 2024-03-08 PROCEDURE — 99213 OFFICE O/P EST LOW 20 MIN: CPT

## 2024-03-08 ASSESSMENT — KERATOMETRY
OS_AXISANGLE2_DEGREES: 61
OS_K2POWER_DIOPTERS: 43.25
OS_AXISANGLE_DEGREES: 151
OD_K1POWER_DIOPTERS: 40.75
OD_K2POWER_DIOPTERS: 41.75
OD_AXISANGLE_DEGREES: 27
OD_AXISANGLE2_DEGREES: 117
OS_K1POWER_DIOPTERS: 40.75

## 2024-03-08 ASSESSMENT — VISUAL ACUITY
OU_CC: J1
OD_CC: 20/50
OS_CC: 20/250

## 2024-03-08 ASSESSMENT — TONOMETRY
OS_IOP_MMHG: 11
OS_IOP_MMHG: 15
OD_IOP_MMHG: 11
OD_IOP_MMHG: 11

## 2024-04-02 ENCOUNTER — CONSULTATION (OUTPATIENT)
Dept: URBAN - METROPOLITAN AREA CLINIC 10 | Facility: CLINIC | Age: 89
End: 2024-04-02

## 2024-04-02 DIAGNOSIS — H04.123: ICD-10-CM

## 2024-04-02 DIAGNOSIS — H02.053: ICD-10-CM

## 2024-04-02 DIAGNOSIS — H02.886: ICD-10-CM

## 2024-04-02 DIAGNOSIS — H02.839: ICD-10-CM

## 2024-04-02 DIAGNOSIS — H02.056: ICD-10-CM

## 2024-04-02 DIAGNOSIS — H02.883: ICD-10-CM

## 2024-04-02 PROCEDURE — 67820 REVISE EYELASHES: CPT | Mod: 50

## 2024-04-02 PROCEDURE — 99213 OFFICE O/P EST LOW 20 MIN: CPT | Mod: 25

## 2024-04-02 ASSESSMENT — VISUAL ACUITY
OS_CC: CF 3FT
OD_CC: 20/60

## 2024-05-07 ENCOUNTER — ESTABLISHED (OUTPATIENT)
Dept: URBAN - METROPOLITAN AREA CLINIC 10 | Facility: CLINIC | Age: 89
End: 2024-05-07

## 2024-05-07 DIAGNOSIS — H02.056: ICD-10-CM

## 2024-05-07 DIAGNOSIS — H04.123: ICD-10-CM

## 2024-05-07 DIAGNOSIS — H02.886: ICD-10-CM

## 2024-05-07 DIAGNOSIS — H02.883: ICD-10-CM

## 2024-05-07 DIAGNOSIS — H02.053: ICD-10-CM

## 2024-05-07 PROCEDURE — 67825 REVISE EYELASHES: CPT | Mod: 50

## 2024-05-07 PROCEDURE — 92012 INTRM OPH EXAM EST PATIENT: CPT | Mod: 25

## 2024-05-07 ASSESSMENT — VISUAL ACUITY
OD_CC: 20/80-1
OS_CC: CF 2FT

## 2024-06-11 ENCOUNTER — FOLLOW UP (OUTPATIENT)
Dept: URBAN - METROPOLITAN AREA CLINIC 10 | Facility: CLINIC | Age: 89
End: 2024-06-11

## 2024-06-11 DIAGNOSIS — H02.053: ICD-10-CM

## 2024-06-11 DIAGNOSIS — H02.056: ICD-10-CM

## 2024-06-11 DIAGNOSIS — H04.123: ICD-10-CM

## 2024-06-11 PROCEDURE — 67820 REVISE EYELASHES: CPT | Mod: 50

## 2024-06-11 PROCEDURE — 92012 INTRM OPH EXAM EST PATIENT: CPT | Mod: 25

## 2024-06-11 PROCEDURE — 92285 EXTERNAL OCULAR PHOTOGRAPHY: CPT

## 2024-06-11 ASSESSMENT — VISUAL ACUITY
OS_CC: CF 4FT
OD_PH: 20/60
OD_CC: 20/80-2

## 2024-06-21 ENCOUNTER — ESTABLISHED COMPREHENSIVE EXAM (OUTPATIENT)
Dept: URBAN - METROPOLITAN AREA SURGICAL CENTER 72 | Facility: SURGICAL CENTER | Age: 89
End: 2024-06-21

## 2024-06-21 DIAGNOSIS — H40.1112: ICD-10-CM

## 2024-06-21 DIAGNOSIS — H04.123: ICD-10-CM

## 2024-06-21 DIAGNOSIS — H02.886: ICD-10-CM

## 2024-06-21 DIAGNOSIS — H40.1123: ICD-10-CM

## 2024-06-21 DIAGNOSIS — H02.883: ICD-10-CM

## 2024-06-21 PROCEDURE — 92014 COMPRE OPH EXAM EST PT 1/>: CPT

## 2024-06-21 ASSESSMENT — TONOMETRY
OS_IOP_MMHG: 12
OD_IOP_MMHG: 10

## 2024-06-21 ASSESSMENT — VISUAL ACUITY
OD_CC: 20/70-2
OS_PH: 20/300-1
OU_CC: J2
OD_PH: 20/50
OS_CC: 20/800

## (undated) RX ORDER — POVIDONE 2.5 MG/.5G: SOLUTION, GEL FORMING / DROPS OPHTHALMIC